# Patient Record
(demographics unavailable — no encounter records)

---

## 2024-12-13 NOTE — ASSESSMENT
[FreeTextEntry1] : 78 year old female w/ UC on vedo maintenance q8wks was in deep remission, here today to discuss her chronic constipation and recent episode causing hemorrhoid.   UC, surveillance cscope reviewed - in endoscopic and histologic remission - pt also in clinical and biomarker remission  - cont Vedolizumab Q 8 weeks - labs and levels wnl  - will be holding Vedo for ~ 10 weeks for upcoming knee surgery  - next cscope 1 year with 2 day prep   Chronic constipation + Pelvic Dyssynergia  - continues with Abeba supplemental for constipation; advised we monitor lab work and discussed possible ADRs of liver injury which she's tolerating well - however she would like an additional option - discussed adding Miralax daily as she does not want prescription laxatives - they have been too strong for her in the past causing diarrhea  - reviewed ARM results - 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 Geraldo Vizcarra - recovering, denies narcotics at this time   F/U 3 months
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
## 2025-01-07 NOTE — HEALTH RISK ASSESSMENT
[Never (0 pts)] : Never (0 points) [No] : In the past 12 months have you used drugs other than those required for medical reasons? No [No falls in past year] : Patient reported no falls in the past year [0] : 2) Feeling down, depressed, or hopeless: Not at all (0) [PHQ-2 Negative - No further assessment needed] : PHQ-2 Negative - No further assessment needed [[id number]] : 0 [Never] : Never [NO] : No [Change in mental status noted] : No change in mental status noted [Language] : denies difficulty with language [With Significant Other] : lives with significant other [Fully functional (bathing, dressing, toileting, transferring, walking, feeding)] : Fully functional (bathing, dressing, toileting, transferring, walking, feeding) [Fully functional (using the telephone, shopping, preparing meals, housekeeping, doing laundry, using] : Fully functional and needs no help or supervision to perform IADLs (using the telephone, shopping, preparing meals, housekeeping, doing laundry, using transportation, managing medications and managing finances)

## 2025-01-07 NOTE — HISTORY OF PRESENT ILLNESS
[de-identified] : This is a 78  -year-old patient with a history of hypertension, diastolic dysfunction, colitis, hypercholesterolemia who is here today for her annual examination

## 2025-01-07 NOTE — ASSESSMENT
[FreeTextEntry1] : Assessment Encounter for preventive health examination (V70.0) (Z00.00) Benign essential hypertension (401.1) (I10) Diastolic dysfunction (429.9) (I51.89) Hyperlipidemia (272.4) (E78.5) Mood disorder (296.90) (F39) Ulcerative colitis (556.9) (K51.90) Problems  colitis  Hypertension  Diastolic dysfunction colitis hypercholesterolemia  Mood disorder  Assessment  Her vital signs were stable her physical examination was unchanged. She was referred to gastroenterology and cardiology and advised that she is also due for bone density test
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
## 2025-01-21 NOTE — ASSESSMENT
[FreeTextEntry1] : 78 year old female w/ UC on vedo maintenance q8wks was in deep remission, here today to discuss her chronic constipation and recent episode causing hemorrhoid. Some fecal leakage as well of soft stools.  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 Geraldo Vizcarra - recovering, denies narcotics at this time   F/U post CSCOPE

## 2025-02-10 NOTE — ADDENDUM
[FreeTextEntry1] : Blood will be drawn in office today.  This note was written by Kerrie Curtis on 02/10/2025 acting as medical scribe for Dr. Vince Gonzáles. I, Dr. Vince Gonzáles, have read and attest that all the information, medical decision making and discharge instructions within are true and accurate.

## 2025-02-10 NOTE — HISTORY OF PRESENT ILLNESS
[FreeTextEntry1] : Ms. HERNDON is a 78-year-old female who returns for follow up with regard to a history of osteoporosis and hypothyroidism. The hypothyroidism has been present for over 30 years. Did have elevated serum calcium x one with subsequent values upper normal with prior intact PTH around 39.  Additional medical history includes that of alopecia, arthritis, GERD, hyperlipidemia, hypertension, UC, Lichen Planus-on doxycycline  She is currently taking [place] 60 mg and 15 mg daily and has been compliant in taking the [place] daily, away from food or any medication that may inhibit absorption. Has been on [place] for about 3-4 years. She has tolerated this medication well without any apparent adverse effects. She denies any temperature intolerance, significant weight changes, or severe fatigue. She in addition denies any palpitations, tremors, anxiousness, change in bowel habits or significant change in moods.  Labs 01/07/25 T3 Uptake %: 32% FTI%: 1.7% FTI: 4.6 Total T4: 5.4 TSH: 0.44 T3 Uptake: 1.2 ____________________________________________________________________________________________________ She too does have a history of osteoporosis.  Did receive one Prolia injection per past endocrinologist but experienced full body aches for several months after receiving the injection. She too was on Fosamax for several years in the past (1975- 1985). ls taking Omega 369 She did break a bone in her leg about 35 yrs ago. Has taken prednisone long-term in the past for ulcerative colitis Mother fell out of a chair and fractured hip. Mother too had rheumatoid arthritis. Takes vitamin D3 2,000 IU daily and caltrate. Diet is sufficient in dairy and greens. Does take magnesium and Co q 10 200 mg   Diet: cottage cheese, salads, spinach, ice cream  S/p right knee replacement on 7/18/24 per Dr. Geraldo Vizcarra. She does most of her walking in the city and wants to be able to walk more often and longer. She reports that her "knee is not flat." She sits with her leg elevated and puts weights on it. Is no longer doing PT.  Based off of last Dexa scan we had agreed to monitor bone marker levels and look to see if we can repeat Dexa scan in one year instead of two years  Last DEXA scan from 02/16/23 did show T-scores: Spine: -0.8 -->Normal; +3.4% as compared with the prior study from 2021. Femoral neck: -2.6 -->Osteoporosis Total hip: -2.1 -->Osteopenia; -7% as compared with prior study from 2020. 1/3 Radius: -2.5 -->Osteoporosis; not imaged previously.  _________________________________________________________________________________________________________ Had seen a hematologist in the past and was told that there was nothing wrong with her blood.  Medications: Metoprolol ER 50 mg, Simvastatin 20 mg, Furosemide 40 mg Entyvio

## 2025-02-10 NOTE — HISTORY OF PRESENT ILLNESS
[FreeTextEntry1] : Ms. HERNDON is a 78-year-old female who returns for follow up with regard to a history of osteoporosis and hypothyroidism. The hypothyroidism has been present for over 30 years. Did have elevated serum calcium x one with subsequent values upper normal with prior intact PTH around 39.  Additional medical history includes that of alopecia, arthritis, GERD, hyperlipidemia, hypertension, UC, Lichen Planus-on doxycycline  She is currently taking [place] 60 mg and 15 mg daily and has been compliant in taking the [place] daily, away from food or any medication that may inhibit absorption. Has been on [place] for about 3-4 years. She has tolerated this medication well without any apparent adverse effects. She denies any temperature intolerance, significant weight changes, or severe fatigue. She in addition denies any palpitations, tremors, anxiousness, change in bowel habits or significant change in moods.  Labs 01/07/25 T3 Uptake %: 32% FTI%: 1.7% FTI: 4.6 Total T4: 5.4 TSH: 0.44 T3 Uptake: 1.2 ____________________________________________________________________________________________________ She too does have a history of osteoporosis.  Did receive one Prolia injection per past endocrinologist but experienced full body aches for several months after receiving the injection. She too was on Fosamax for several years in the past (1975- 1985). ls taking Omega 369 She did break a bone in her leg about 35 yrs ago. Has taken prednisone long-term in the past for ulcerative colitis Mother fell out of a chair and fractured hip. Mother too had rheumatoid arthritis. Takes vitamin D3 2,000 IU daily and caltrate. Diet is sufficient in dairy and greens. Does take magnesium and Co q 10 200 mg   Diet: cottage cheese, salads, spinach, ice cream  S/p right knee replacement on 7/18/24 per Dr. Geraldo Vizcarra. She does most of her walking in the city and wants to be able to walk more often and longer. She reports that her "knee is not flat." She sits with her leg elevated and puts weights on it. Is no longer doing PT.  Based off of last Dexa scan we had agreed to monitor bone marker levels and look to see if we can repeat Dexa scan in one year instead of two years  Last DEXA scan from 02/16/23 did show T-scores: Spine: -0.8 -->Normal; +3.4% as compared with the prior study from 2021. Femoral neck: -2.6 -->Osteoporosis Total hip: -2.1 -->Osteopenia; -7% as compared with prior study from 2020. 1/3 Radius: -2.5 -->Osteoporosis; not imaged previously.  _________________________________________________________________________________________________________ Had seen a hematologist in the past and was told that there was nothing wrong with her blood.  Medications: Metoprolol ER 50 mg, Simvastatin 20 mg, Furosemide 40 mg Entyvio

## 2025-04-07 NOTE — HISTORY OF PRESENT ILLNESS
[de-identified] : This is a 78  -year-old patient with a history of diastolic dysfunction, hypercholesterolemia, mood disorder, colitis who is here today for her follow-up visit.

## 2025-04-07 NOTE — ASSESSMENT
[FreeTextEntry1] : Problems Diastolic dysfunction Hypercholesterolemia Colitis B12 deficiency Mood disorder Assessment The patient presently feels well and denies any abdominal discomfort.  In addition she is following up with her therapist and the present combination of medications working well.  She does not feel depressed overall manic.  Her colitis is under control and she has not had any diarrhea or rectal bleeding.  .  Her blood pressure was normal physical examination was normal.  Bloods were obtained .  Patient had a CT of the chest which revealed stability of her pulmonary nodule.

## 2025-04-07 NOTE — HEALTH RISK ASSESSMENT
[No] : No [Never (0 pts)] : Never (0 points) [No falls in past year] : Patient reported no falls in the past year [0] : 2) Feeling down, depressed, or hopeless: Not at all (0) [PHQ-2 Negative - No further assessment needed] : PHQ-2 Negative - No further assessment needed [[id number]] : 0 [Never] : Never

## 2025-06-27 NOTE — ASSESSMENT
[FreeTextEntry1] : 78 year old female w/ UC on vedo maintenance q8wks in deep remission, s/p CSCOPE May 2025.   UC - in endoscopic and histologic remission - pt also in clinical and biomarker remission  - cont Vedolizumab Q 8 weeks - labs and levels wnl - consider next cscope in 3 years with 2 day prep - 2028 however   Chronic constipation + Pelvic Dyssynergia  - d/c'd with Abeba supplemental for constipation as stopped working - fecal incontinence episodes occasionally; nightly at home but recently had to go home from a social event due to incontinence episode in public - will refer to Dr. Nieves to discuss Sacral Nerve Stimulator   - taking Mg O7; decreased to 2 per night  - also using flax seed, citrucel - stopped miralax and prescription laxatives due to significant diarrhea  - reviewed ARM results - hypersensitivity in rectum as well as dyssynergia type 1; s/p biofeedback/pelvic floor therapy but does not report any significant improvement - likely this is issue here given she hardly feels she's passing her stool and has leakage of more solid stools  - discussed seeing nutritionist for help with diet to improve BMs, pt will consider but concerned about costs    HCM - Shingrix UTD - pneumonia vaccine UTD - Tdap 2018 - no tobacco use - no depression, does see Psychiatrist for 30 years for phobias/mental health  - Vitamin D, B12 and folate WNL  - DERM UTD  - mammograms annually   F/U 6 mo
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
## 2025-06-27 NOTE — CONSULT LETTER
[Dear  ___] : Dear  [unfilled], [Referral Letter:] : I am referring [unfilled] to you for further evaluation.  My most recent evaluation follows. [Please see my note below.] : Please see my note below. [Sincerely,] : Sincerely, [FreeTextEntry3] : Rafita Reyes MD Professor of Medicine Chief of GI Director IBD Program [facility]

## 2025-06-27 NOTE — CONSULT LETTER
[Dear  ___] : Dear  [unfilled], [Referral Letter:] : I am referring [unfilled] to you for further evaluation.  My most recent evaluation follows. [Please see my note below.] : Please see my note below. [Sincerely,] : Sincerely, [FreeTextEntry3] : Rafita Reyes MD Professor of Medicine Chief of GI Director IBD Program [facility]
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
## 2025-07-07 NOTE — HISTORY OF PRESENT ILLNESS
[de-identified] : This is a 78 -year-old patient with a history of diastolic dysfunction, hypercholesterolemia, mood disorder, colitis who is here today for her follow-up visit.

## 2025-07-07 NOTE — HEALTH RISK ASSESSMENT
[No] : No [Never (0 pts)] : Never (0 points) [No falls in past year] : Patient reported no falls in the past year [0] : 2) Feeling down, depressed, or hopeless: Not at all (0) [PHQ-2 Negative - No further assessment needed] : PHQ-2 Negative - No further assessment needed [[id number]] : 0 [Never] : Never